# Patient Record
Sex: MALE | Race: WHITE
[De-identification: names, ages, dates, MRNs, and addresses within clinical notes are randomized per-mention and may not be internally consistent; named-entity substitution may affect disease eponyms.]

---

## 2018-04-02 ENCOUNTER — HOSPITAL ENCOUNTER (OUTPATIENT)
Dept: HOSPITAL 25 - OREAST | Age: 68
Discharge: HOME | End: 2018-04-02
Attending: OPHTHALMOLOGY
Payer: MEDICARE

## 2018-04-02 VITALS — DIASTOLIC BLOOD PRESSURE: 88 MMHG | SYSTOLIC BLOOD PRESSURE: 133 MMHG

## 2018-04-02 DIAGNOSIS — H25.12: Primary | ICD-10-CM

## 2018-04-02 DIAGNOSIS — E03.9: ICD-10-CM

## 2018-04-02 DIAGNOSIS — Z72.0: ICD-10-CM

## 2018-04-02 PROCEDURE — V2632 POST CHMBR INTRAOCULAR LENS: HCPCS

## 2018-04-03 NOTE — OP
DATE OF OPERATION:  04/02/18 - [facility]

 

DATE OF BIRTH:  [birth year]

 

SURGEON:  Umesh Armenta MD

 

ANESTHESIA:  Monitored anesthesia care.

 

PRE-OP DIAGNOSIS:  Cataract, left eye.

 

POST-OP DIAGNOSIS:  Cataract, left eye.

 

OPERATIVE PROCEDURE:  Extracapsular cataract extraction of the left eye with 
intraocular lens implant.

 

IMPLANTS:  SN60WF 21.5 diopter lens, left eye.

 

COMPLICATIONS:  None.

 

DESCRIPTION OF PROCEDURE:  The patient was given phenylephrine 2.5% and 
cyclopentolate 1% eyedrops to the operative eye in the preoperative area.  The 
patient was brought to the operating room where a time-out was taken to verify 
the correct patient, site and side of surgery.  The patient's left eye was 
prepped and draped in usual sterile fashion with 5% Betadine.  A second timeout 
was taken to verify the correct patient, site and side of surgery, and correct 
lens selection. A lid speculum was placed in the left eye.  A 1-mm paracentesis 
blade was used to make a clear corneal incision in the inferotemporal position. 
Preservative-free 1% lidocaine was injected into the anterior chamber.  
DisCoVisc was then injected into the anterior chamber.  A 2.75-mm keratome 
blade was used to make a triplanar incision at the superotemporal position.  A 
cystotome initiated a capsulorrhexis, which was completed with Utrata forceps 
in a continuous and curvilinear manner. Hydrodissection of the lens was 
performed with BSS on a cannula.  The lens could be spun in the capsular bag.  
The phacoemulsification handpiece was used with a divide- and-conquer technique 
to remove the nucleus in its entirety with 13.24 CDE.  The I/A handpiece then 
removed the residual cortical lens material.  DisCoVisc was injected to inflate 
the capsular bag.  The planned SN60WF 21.5 diopter lens was injected in the 
capsular bag. The residual DisCoVisc was removed from the eye with the I/A 
handpiece. The corneal incisions were hydrated and no leaks occurred at 
physiologic pressure around 20 mmHg per palpation.  The lid speculum was 
removed and drapes removed.  Maxitrol ointment was placed on the surface of the 
operative eye.  An adhesive patch and shield was then placed on the operative 
eye.  The patient was taken to the postoperative area in stable condition.

 

 [id number]/[facility] #: [id number]

MTDENRIQUE